# Patient Record
Sex: FEMALE | URBAN - METROPOLITAN AREA
[De-identification: names, ages, dates, MRNs, and addresses within clinical notes are randomized per-mention and may not be internally consistent; named-entity substitution may affect disease eponyms.]

---

## 2018-10-30 ENCOUNTER — HISTORICAL (OUTPATIENT)
Dept: RADIOLOGY | Facility: HOSPITAL | Age: 48
End: 2018-10-30

## 2018-10-30 LAB
ABS NEUT (OLG): 3.86
ALBUMIN SERPL-MCNC: 3.8 GM/DL (ref 3.4–5)
ALBUMIN/GLOB SERPL: 0.9 RATIO (ref 1.1–2)
ALP SERPL-CCNC: 93 UNIT/L (ref 46–116)
ALT SERPL-CCNC: 29 UNIT/L (ref 12–78)
APPEARANCE, UA: NORMAL
AST SERPL-CCNC: 14 UNIT/L (ref 10–37)
BACTERIA #/AREA URNS AUTO: NORMAL /HPF
BASOPHILS # BLD AUTO: 0.03 X10(3)/MCL
BASOPHILS NFR BLD AUTO: 0.4 %
BILIRUB SERPL-MCNC: 0.3 MG/DL (ref 0.2–1)
BILIRUB UR QL STRIP: NEGATIVE
BILIRUBIN DIRECT+TOT PNL SERPL-MCNC: 0.08 MG/DL (ref 0–0.2)
BILIRUBIN DIRECT+TOT PNL SERPL-MCNC: 0.22 MG/DL
BUN SERPL-MCNC: 13 MG/DL (ref 7–18)
CALCIUM SERPL-MCNC: 9.3 MG/DL (ref 8.5–10.1)
CHLORIDE SERPL-SCNC: 104 MMOL/L (ref 98–107)
CHOLEST SERPL-MCNC: 220 MG/DL (ref 50–200)
CHOLEST/HDLC SERPL: 3 {RATIO} (ref 0–5)
CO2 SERPL-SCNC: 27.9 MMOL/L (ref 21–32)
COLOR UR: YELLOW
CREAT SERPL-MCNC: 0.91 MG/DL (ref 0.55–1.02)
EOSINOPHIL # BLD AUTO: 0.13 X10(3)/MCL
EOSINOPHIL NFR BLD AUTO: 1.8 %
ERYTHROCYTE [DISTWIDTH] IN BLOOD BY AUTOMATED COUNT: 15 %
GLOBULIN SER-MCNC: 4.3 GM/DL (ref 2.4–3.5)
GLUCOSE (UA): NEGATIVE
GLUCOSE SERPL-MCNC: 91 MG/DL (ref 74–106)
HCT VFR BLD AUTO: 38.4 % (ref 34–46)
HDLC SERPL-MCNC: 72 MG/DL (ref 35–60)
HGB BLD-MCNC: 12.5 GM/DL (ref 11.3–15.4)
HGB UR QL STRIP: NEGATIVE
IMM GRANULOCYTES # BLD AUTO: 0.01 10*3/UL (ref 0–0.1)
IMM GRANULOCYTES NFR BLD AUTO: 0.1 % (ref 0–1)
KETONES UR QL STRIP: NEGATIVE
LDLC SERPL CALC-MCNC: 128 MG/DL (ref 50–140)
LEUKOCYTE ESTERASE UR QL STRIP: NEGATIVE
LYMPHOCYTES # BLD AUTO: 2.58 X10(3)/MCL
LYMPHOCYTES NFR BLD AUTO: 36.1 %
MCH RBC QN AUTO: 27.7 PG (ref 27–33)
MCHC RBC AUTO-ENTMCNC: 32.6 GM/DL (ref 32–35)
MCV RBC AUTO: 85.1 FL (ref 81–97)
MONOCYTES # BLD AUTO: 0.54 X10(3)/MCL
MONOCYTES NFR BLD AUTO: 7.6 %
NEUTROPHILS # BLD AUTO: 3.86 X10(3)/MCL
NEUTROPHILS NFR BLD AUTO: 54 %
NITRITE UR QL STRIP.AUTO: NEGATIVE
PH UR STRIP: 6.5 [PH] (ref 4.6–8)
PLATELET # BLD AUTO: 309 X10(3)/MCL (ref 151–368)
PMV BLD AUTO: 10 FL
POTASSIUM SERPL-SCNC: 4.9 MMOL/L (ref 3.5–5.1)
PROT SERPL-MCNC: 8.1 GM/DL (ref 6.4–8.2)
PROT UR QL STRIP: NEGATIVE
RBC # BLD AUTO: 4.51 X10(6)/MCL (ref 3.9–5)
RBC #/AREA URNS HPF: NORMAL /HPF
SODIUM SERPL-SCNC: 139 MMOL/L (ref 136–145)
SP GR UR STRIP: 1.02 (ref 1–1.03)
SQUAMOUS EPITHELIAL, UA: NORMAL
TRIGL SERPL-MCNC: 98 MG/DL (ref 30–150)
UROBILINOGEN UR STRIP-ACNC: 0.2
VLDLC SERPL CALC-MCNC: 20 MG/DL
WBC # SPEC AUTO: 7.15 X10(3)/MCL (ref 3.4–9.2)
WBC #/AREA URNS AUTO: NORMAL /[HPF]

## 2018-11-06 ENCOUNTER — HISTORICAL (OUTPATIENT)
Dept: RADIOLOGY | Facility: HOSPITAL | Age: 48
End: 2018-11-06

## 2018-11-06 LAB
HAV IGM SERPL QL IA: NEGATIVE
HBV CORE IGM SERPL QL IA: NEGATIVE
HBV SURFACE AG SERPL QL IA: NEGATIVE
HCV AB SERPL QL IA: NEGATIVE
HEPATITIS PANEL INTERP: NORMAL

## 2024-04-16 ENCOUNTER — HOSPITAL ENCOUNTER (EMERGENCY)
Facility: HOSPITAL | Age: 54
Discharge: HOME | End: 2024-04-16
Attending: FAMILY MEDICINE
Payer: COMMERCIAL

## 2024-04-16 VITALS
TEMPERATURE: 99 F | HEIGHT: 65 IN | SYSTOLIC BLOOD PRESSURE: 120 MMHG | DIASTOLIC BLOOD PRESSURE: 74 MMHG | BODY MASS INDEX: 33.32 KG/M2 | WEIGHT: 200 LBS | HEART RATE: 88 BPM | RESPIRATION RATE: 20 BRPM | OXYGEN SATURATION: 98 %

## 2024-04-16 DIAGNOSIS — J10.1 INFLUENZA B: Primary | ICD-10-CM

## 2024-04-16 LAB
FLUAV AG UPPER RESP QL IA.RAPID: NOT DETECTED
FLUBV AG UPPER RESP QL IA.RAPID: DETECTED
SARS-COV-2 RNA RESP QL NAA+PROBE: NOT DETECTED
STREP A PCR (OHS): NOT DETECTED

## 2024-04-16 PROCEDURE — 87651 STREP A DNA AMP PROBE: CPT | Performed by: FAMILY MEDICINE

## 2024-04-16 PROCEDURE — 99284 EMERGENCY DEPT VISIT MOD MDM: CPT

## 2024-04-16 PROCEDURE — 0240U COVID/FLU A&B PCR: CPT | Performed by: FAMILY MEDICINE

## 2024-04-16 RX ORDER — PROMETHAZINE HYDROCHLORIDE AND DEXTROMETHORPHAN HYDROBROMIDE 6.25; 15 MG/5ML; MG/5ML
5 SYRUP ORAL EVERY 4 HOURS PRN
Qty: 180 ML | Refills: 0 | Status: SHIPPED | OUTPATIENT
Start: 2024-04-16 | End: 2024-04-26

## 2024-04-16 RX ORDER — OSELTAMIVIR PHOSPHATE 75 MG/1
75 CAPSULE ORAL 2 TIMES DAILY
Qty: 10 CAPSULE | Refills: 0 | Status: SHIPPED | OUTPATIENT
Start: 2024-04-16 | End: 2024-04-21

## 2024-04-16 NOTE — ED PROVIDER NOTES
Encounter Date: 4/16/2024       History     Chief Complaint   Patient presents with    Cough    Chills     Cough, chills, body aches, fatigue and headache since yesterday.       Patient is a 54-year-old female comes in with chills, cough, body aches fatigue and headache since yesterday.  States that she was home from work and today she feels a bit worse    The history is provided by the patient.     Review of patient's allergies indicates:  No Known Allergies  No past medical history on file.  No past surgical history on file.  No family history on file.  Social History     Tobacco Use    Smoking status: Every Day     Current packs/day: 0.50     Types: Cigarettes    Smokeless tobacco: Never   Substance Use Topics    Alcohol use: Yes     Comment: occasional    Drug use: Yes     Types: Marijuana     Comment: daily     Review of Systems   Constitutional:  Positive for fatigue.   HENT:  Positive for congestion and sinus pressure.    Eyes:  Positive for redness.   Respiratory:  Positive for cough.    Cardiovascular: Negative.    Gastrointestinal: Negative.    Endocrine: Negative.    Musculoskeletal: Negative.    Skin: Negative.    Neurological:  Positive for headaches.   Psychiatric/Behavioral: Negative.     All other systems reviewed and are negative.      Physical Exam     Initial Vitals [04/16/24 1453]   BP Pulse Resp Temp SpO2   120/74 88 20 98.6 °F (37 °C) 95 %      MAP       --         Physical Exam    Nursing note and vitals reviewed.  Constitutional: She appears well-developed.   HENT:   Head: Normocephalic.   Nose: Right sinus exhibits maxillary sinus tenderness. Left sinus exhibits maxillary sinus tenderness.   Mouth/Throat: Mucous membranes are normal. Posterior oropharyngeal erythema present.   Eyes: Pupils are equal, round, and reactive to light.   Neck:   Normal range of motion.  Cardiovascular:  Normal rate, regular rhythm and normal heart sounds.           Pulmonary/Chest: Breath sounds normal.    Abdominal: Abdomen is soft.   Musculoskeletal:         General: Normal range of motion.      Cervical back: Normal range of motion.     Neurological: She is alert and oriented to person, place, and time. GCS score is 15. GCS eye subscore is 4. GCS verbal subscore is 5. GCS motor subscore is 6.   Skin: Skin is warm and dry. Capillary refill takes less than 2 seconds.   Psychiatric: She has a normal mood and affect.         ED Course   Procedures  Labs Reviewed   COVID/FLU A&B PCR - Abnormal; Notable for the following components:       Result Value    Influenza B PCR Detected (*)     All other components within normal limits    Narrative:     The Xpert Xpress SARS-CoV-2/FLU/RSV plus is a rapid, multiplexed real-time PCR test intended for the simultaneous qualitative detection and differentiation of SARS-CoV-2, Influenza A, Influenza B, and respiratory syncytial virus (RSV) viral RNA in either nasopharyngeal swab or nasal swab specimens.         STREP GROUP A BY PCR - Normal    Narrative:     The Xpert Xpress Strep A test is a rapid, qualitative in vitro diagnostic test for the detection of Streptococcus pyogenes (Group A ß-hemolytic Streptococcus, Strep A) in throat swab specimens from patients with signs and symptoms of pharyngitis.            Imaging Results    None          Medications - No data to display  Medical Decision Making  This patient is a 54-year-old female who comes in with nasal congestion, weakness, chills, fever, headache, body ache  Differential diagnosis:  COVID, flu, strep    Amount and/or Complexity of Data Reviewed  Labs: ordered.     Details: Patient tested positive for influenza B                                      Clinical Impression:  Final diagnoses:  [J10.1] Influenza B (Primary)          ED Disposition Condition    Discharge Stable          ED Prescriptions       Medication Sig Dispense Start Date End Date Auth. Provider    oseltamivir (TAMIFLU) 75 MG capsule Take 1 capsule (75 mg  total) by mouth 2 (two) times daily. for 5 days 10 capsule 4/16/2024 4/21/2024 Kathy Betancourt MD    promethazine-dextromethorphan (PROMETHAZINE-DM) 6.25-15 mg/5 mL Syrp Take 5 mLs by mouth every 4 (four) hours as needed. 180 mL 4/16/2024 4/26/2024 Kathy Betancourt MD          Follow-up Information       Follow up With Specialties Details Why Contact Info    PCP  Schedule an appointment as soon as possible for a visit                Kathy Betancourt MD  04/16/24 1714

## 2024-04-16 NOTE — Clinical Note
"Vida Taylor" Yara was seen and treated in our emergency department on 4/16/2024.  She may return to work on 04/19/2024.       If you have any questions or concerns, please don't hesitate to call.      Kathy Betancourt MD"

## 2025-06-06 DIAGNOSIS — M92.60 HAGLUND'S DEFORMITY: ICD-10-CM

## 2025-06-06 DIAGNOSIS — M76.62 TENDONITIS, ACHILLES, LEFT: Primary | ICD-10-CM

## 2025-06-17 ENCOUNTER — CLINICAL SUPPORT (OUTPATIENT)
Dept: REHABILITATION | Facility: HOSPITAL | Age: 55
End: 2025-06-17
Payer: MEDICAID

## 2025-06-17 DIAGNOSIS — M76.61 TENDONITIS, ACHILLES, RIGHT: Primary | ICD-10-CM

## 2025-06-17 PROCEDURE — 97162 PT EVAL MOD COMPLEX 30 MIN: CPT

## 2025-06-17 NOTE — PROGRESS NOTES
Outpatient Rehab    Physical Therapy Evaluation (only)    Patient Name: Vida Livingston  MRN: [id number]  YOB: 1970  Encounter Date: 6/17/2025    Therapy Diagnosis:   Encounter Diagnosis   Name Primary?    Tendonitis, Achilles, right Yes     Physician: Catia Matamoros DPM    Physician Orders: Eval and Treat  Medical Diagnosis: Tendonitis, Achilles, left  Surgical Diagnosis: Not applicable for this Episode   Surgical Date: Not applicable for this Episode    Visit # / Visits Authorized:  1 / 1  Insurance Authorization Period: 6/6/2025 to 6/6/2026  Date of Evaluation: 6/17/2025  Plan of Care Certification: 6/17/2025 to 9/10/2025     Time In: 1450   Time Out: 1555  Total Time (in minutes): 65   Total Billable Time (in minutes):      Intake Outcome Measure for FOTO Survey    Therapist reviewed FOTO scores for Vida Livingston on 6/17/2025.   FOTO report - see Media section or FOTO account episode details.     Intake Score: 38%    Precautions:       Subjective   History of Present Illness  Vida is a 55 y.o. female                  History of Present Condition/Illness: Patient reports she is referred due to pain at her left ankle that began about 4 months ago with no sudden injury or accidnet and has increased over the past few months. She went to the doctor and was told that she had a spur on her heel that was expanding her achilles.    Pain     Patient reports a current pain level of 0/10.  Pain at worst is reported as 10/10.   Pain-Aggravating Factors: Walking  Burning sudden pain at rest        Past Medical History/Physical Systems Review:   Vida Livingston  has no past medical history on file.    Vida Livingston  has no past surgical history on file.    Vida currently has no medications in their medication list.    Review of patient's allergies indicates:  No Known Allergies     Objective       Ankle/Foot Range of Motion   Right Ankle/Foot   Active (deg) Passive (deg) Pain   Dorsiflexion (KE) 5        Dorsiflexion (KF) 8       Plantar Flexion 55       Ankle Inversion 25       Ankle Eversion 22       Subtalar Inversion         Subtalar Eversion         Great Toe MTP Flexion         Great Toe MTP Extension         Great Toe IP Flexion             Left Ankle/Foot   Active (deg) Passive (deg) Pain   Dorsiflexion (KE) -5       Dorsiflexion (KF) 8       Plantar Flexion 55       Ankle Inversion 25       Ankle Eversion 15       Subtalar Inversion         Subtalar Eversion         Great Toe MTP Flexion         Great Toe MTP Extension         Great Toe IP Flexion             Flexibility: gastrocnemius = max restr L              Ankle/Foot Strength - Planes of Motion   Right Strength Right Pain Left Strength Left  Pain   Dorsiflexion (L4) 4+   4     Plantar Flexion (S1) 4+   1     Inversion 4+   3+     Eversion 4+   3+     Great Toe Flexion 4+         Great Toe Extension (L5) 4+         Lesser Toes Flexion 4+         Lesser Toes Extension 4+                   Four Stage Balance Test                 Single Leg Stand - Right Foot: 12 sec  Single Leg Stand - Left Foot: 2 sec       Sit to Stand Testing  The patient completed 5 sit to stand transfers in 12 sec.               Ambulation Details    2 MWT = 310'    Gait Analysis  Gait Pattern: Antalgic  Walking Speed: Decreased         Left Side Walking Observations  Decreased: Stance Time     Ankle/Foot Observations During Gait  Left: Ankle Delayed Push Off and Flat Foot Initial Contact       Eval 6/17/25     Pain    10/10 w      Posture/Appearance    Good posture and overall appearance     Palpation    Obvious swelling and pain at posterior calcaneous     Circumference: R malleolus/calcaneous 27.8, L 29.0     Dermatomes    In tact light touch. No numbness or tingling reported.     AROM Ankle    Right -   DF = 5  DF w KB = 8  PF = 55  Inv = 25  Ev = 22    Left -   DF = -5  DF w KB = 8  PF = 55  Inv = 25  Ev = 15        Time Entry(in minutes):  PT Evaluation (Moderate) Time  Entry: 65    Assessment & Plan   Assessment  Vida presents with a condition of Moderate complexity.   Presentation of Symptoms: Evolving  Will Comorbidities Impact Care: No            Prognosis: Good  Assessment Details: Patient presents with swelling at left heel w decreased HC flexibility and strength. This limits her left ankle push off during gait and is resulting in an antalgic gait pattern. Discussed HEP to include towel HC stretch w warmth before and ice following stretching. Encouraged use of heel lift at left shoe during work and issued one here today. Discussed POC and patient in agreement.    Plan  From a physical therapy perspective, the patient would benefit from: Skilled Rehab Services    Planned therapy interventions include: Therapeutic exercise, Therapeutic activities, Neuromuscular re-education, Manual therapy, and Gait training.    Planned modalities to include: Cryotherapy (cold pack), Electrical stimulation - passive/unattended, Thermotherapy (hot pack), and Ultrasound.        Visit Frequency: 2 times Per Week for 3 Months.       This plan was discussed with Patient.   Discussion participants: Agreed Upon Plan of Care  Plan details: Patient will benefit from therapy for modalities for pain and inflammation relief, to improve flexibility and ROM at left ankle and to restore strength at left ankle for restoration of functional mobility, gait pattern and activity tolerance. Patient in agreement with plan.          The patient's spiritual, cultural, and educational needs were considered, and the patient is agreeable to the plan of care and goals.           Goals:   Active       Ambulation/movement       Patient will accommodate walking on uneven surfaces with no increase in symptoms       Start:  06/17/25    Expected End:  09/17/25               Functional outcome       Patient will show a significant change in FOTO patient-reported outcome tool to demonstrate subjective improvement       Start:   06/17/25    Expected End:  09/17/25            Patient will report at least 50% overall perceived improvement since start of care       Start:  06/17/25    Expected End:  09/17/25            Patient will demonstrate independence in home program for support of progression       Start:  06/17/25    Expected End:  09/17/25               Pain       Patient will report a 2 point reduction in pain while performing working       Start:  06/17/25    Expected End:  09/17/25               Range of Motion       Patient will achieve left ankle dorsiflexion ROM 5 degrees       Start:  06/17/25    Expected End:  09/17/25               Strength       Patient will achieve left ankle plantar flexion strength of 4/5       Start:  06/17/25    Expected End:  09/17/25                Priti Birmingham, PT

## 2025-07-15 ENCOUNTER — CLINICAL SUPPORT (OUTPATIENT)
Dept: REHABILITATION | Facility: HOSPITAL | Age: 55
End: 2025-07-15
Payer: MEDICAID

## 2025-07-15 DIAGNOSIS — M76.61 TENDONITIS, ACHILLES, RIGHT: Primary | ICD-10-CM

## 2025-07-15 PROCEDURE — 97530 THERAPEUTIC ACTIVITIES: CPT

## 2025-07-15 PROCEDURE — 97110 THERAPEUTIC EXERCISES: CPT

## 2025-07-15 PROCEDURE — 97014 ELECTRIC STIMULATION THERAPY: CPT

## 2025-07-15 PROCEDURE — 97140 MANUAL THERAPY 1/> REGIONS: CPT

## 2025-07-15 NOTE — PROGRESS NOTES
Outpatient Rehab    Physical Therapy Visit    Patient Name: Vida Livingston  MRN: [id number]  YOB: 1970  Encounter Date: 7/15/2025    Therapy Diagnosis:   Encounter Diagnosis   Name Primary?    Tendonitis, Achilles, right Yes     Physician: Catia Matamoros DPM    Physician Orders: Eval and Treat  Medical Diagnosis: Achilles tendinitis  Surgical Diagnosis: Not applicable for this Episode   Surgical Date: Not applicable for this Episode  Days Since Last Surgery: Not applicable for this Episode    Visit # / Visits Authorized:  1 / 12  Insurance Authorization Period: 6/20/2025 to 8/19/2025  Date of Evaluation: 6/17/2025  Plan of Care Certification: 6/17/2025 to 9/17/2025      Time In: 1250   Time Out: 1415  Total Time (in minutes): 85   Total Billable Time (in minutes): 85     FOTO:  Intake Score: 38%  Survey Score 2: Not applicable for this Episode%  Survey Score 3: Not applicable for this Episode%    Precautions:         Subjective   Patient returns after a month out due to scheduling conflicts. She continues to have pain at posterior ankle/Achilles. She is still using boot for standing at work..         Objective            Treatment:  Therapeutic Exercise  TE 1: Ankle TB  TE 2: marbles and toe crunches, Baps  TE 3: HC stretch  Manual Therapy  MT 1: STM and stretching at ankle  Therapeutic Activity  TA 1: standing heel raises and mini squats  TA 2: NuStep    Time Entry(in minutes):  E-Stim (Unattended) Time Entry: 15  Manual Therapy Time Entry: 15  Therapeutic Activity Time Entry: 30  Therapeutic Exercise Time Entry: 25    Assessment & Plan   Assessment: Exercise focus on STM, stretching and strengthening at left ankle with fair response. Obvious swelling and inflammation persists. ROM is improving, but functional use remains limited. Discussed using shoe lift to transition to tennis shoes, but patient says the pain is too much for that at this time. Ice and stim at end of session. Will monitor and  advance as able.       The patient will continue to benefit from skilled outpatient physical therapy in order to address the deficits listed in the problem list on the initial evaluation, provide patient and family education, and maximize the patients level of independence in the home and community environments.     The patient's spiritual, cultural, and educational needs were considered, and the patient is agreeable to the plan of care and goals.           Plan: Patient will continue to benefit from therapy for progressive AROM, strengthening, and to improve standing tolerance for functional use and return to PLOF. Patient in agreement with plan.    Goals:   Active       Ambulation/movement       Patient will accommodate walking on uneven surfaces with no increase in symptoms       Start:  06/17/25    Expected End:  09/17/25               Functional outcome       Patient will show a significant change in FOTO patient-reported outcome tool to demonstrate subjective improvement       Start:  06/17/25    Expected End:  09/17/25            Patient will report at least 50% overall perceived improvement since start of care       Start:  06/17/25    Expected End:  09/17/25            Patient will demonstrate independence in home program for support of progression       Start:  06/17/25    Expected End:  09/17/25               Pain       Patient will report a 2 point reduction in pain while performing working       Start:  06/17/25    Expected End:  09/17/25               Range of Motion       Patient will achieve left ankle dorsiflexion ROM 5 degrees       Start:  06/17/25    Expected End:  09/17/25               Strength       Patient will achieve left ankle plantar flexion strength of 4/5       Start:  06/17/25    Expected End:  09/17/25                Priti Birmingham, PT

## 2025-07-17 ENCOUNTER — CLINICAL SUPPORT (OUTPATIENT)
Dept: REHABILITATION | Facility: HOSPITAL | Age: 55
End: 2025-07-17
Payer: MEDICAID

## 2025-07-17 DIAGNOSIS — M76.61 TENDONITIS, ACHILLES, RIGHT: Primary | ICD-10-CM

## 2025-07-17 PROCEDURE — 97110 THERAPEUTIC EXERCISES: CPT

## 2025-07-17 PROCEDURE — 97014 ELECTRIC STIMULATION THERAPY: CPT

## 2025-07-17 PROCEDURE — 97530 THERAPEUTIC ACTIVITIES: CPT

## 2025-07-17 PROCEDURE — 97140 MANUAL THERAPY 1/> REGIONS: CPT

## 2025-07-17 NOTE — PROGRESS NOTES
Outpatient Rehab    Physical Therapy Visit    Patient Name: Vida Livingston  MRN: [id number]  YOB: 1970  Encounter Date: 7/17/2025    Therapy Diagnosis:   Encounter Diagnosis   Name Primary?    Tendonitis, Achilles, right Yes     Physician: Catia Matamoros DPM    Physician Orders: Eval and Treat  Medical Diagnosis: Achilles tendinitis  Surgical Diagnosis: Not applicable for this Episode   Surgical Date: Not applicable for this Episode  Days Since Last Surgery: Not applicable for this Episode    Visit # / Visits Authorized:  2 / 24  Insurance Authorization Period: 6/20/2025 to 8/19/2025  Date of Evaluation: 6/17/2025  Plan of Care Certification: 6/17/2025 to 9/17/2025      Time In: 1250   Time Out: 1417  Total Time (in minutes): 87   Total Billable Time (in minutes): 87     FOTO:  Intake Score: 38%  Survey Score 2: Not applicable for this Episode%  Survey Score 3: Not applicable for this Episode%    Precautions:         Subjective   Patient reports significant relief after first session and has been able to wear a regular shoe since then..         Objective            Treatment:  Therapeutic Exercise  TE 1: Ankle TB  TE 2: marbles and toe crunches, Baps  TE 3: HC stretch  Manual Therapy  MT 1: STM and stretching at ankle  Therapeutic Activity  TA 1: standing heel raises and mini squats  TA 2: NuStep    Time Entry(in minutes):  E-Stim (Unattended) Time Entry: 15  Ultrasound Time Entry: 8  Manual Therapy Time Entry: 10  Therapeutic Activity Time Entry: 30  Therapeutic Exercise Time Entry: 24    Assessment & Plan   Assessment: Exercise focus on STM, stretching and strengthening at left ankle with fair response. Obvious swelling and inflammation persists. ROM is improving, but functional use remains limited. Discussed using shoe lift to transition to tennis shoes, but patient says the pain is too much for that at this time. Ice and stim at end of session. Will monitor and advance as able.       The patient  will continue to benefit from skilled outpatient physical therapy in order to address the deficits listed in the problem list on the initial evaluation, provide patient and family education, and maximize the patients level of independence in the home and community environments.     The patient's spiritual, cultural, and educational needs were considered, and the patient is agreeable to the plan of care and goals.           Plan: Patient will continue to benefit from therapy for progressive AROM, strengthening, and to improve standing tolerance for functional use and return to PLOF. Patient in agreement with plan.    Goals:   Active       Ambulation/movement       Patient will accommodate walking on uneven surfaces with no increase in symptoms       Start:  06/17/25    Expected End:  09/17/25               Functional outcome       Patient will show a significant change in FOTO patient-reported outcome tool to demonstrate subjective improvement       Start:  06/17/25    Expected End:  09/17/25            Patient will report at least 50% overall perceived improvement since start of care       Start:  06/17/25    Expected End:  09/17/25            Patient will demonstrate independence in home program for support of progression       Start:  06/17/25    Expected End:  09/17/25               Pain       Patient will report a 2 point reduction in pain while performing working       Start:  06/17/25    Expected End:  09/17/25               Range of Motion       Patient will achieve left ankle dorsiflexion ROM 5 degrees       Start:  06/17/25    Expected End:  09/17/25               Strength       Patient will achieve left ankle plantar flexion strength of 4/5       Start:  06/17/25    Expected End:  09/17/25                Priti Birmingham, PT

## 2025-07-22 ENCOUNTER — CLINICAL SUPPORT (OUTPATIENT)
Dept: REHABILITATION | Facility: HOSPITAL | Age: 55
End: 2025-07-22
Payer: MEDICAID

## 2025-07-22 DIAGNOSIS — M76.61 TENDONITIS, ACHILLES, RIGHT: Primary | ICD-10-CM

## 2025-07-22 PROCEDURE — 97140 MANUAL THERAPY 1/> REGIONS: CPT

## 2025-07-22 PROCEDURE — 97110 THERAPEUTIC EXERCISES: CPT

## 2025-07-22 PROCEDURE — 97014 ELECTRIC STIMULATION THERAPY: CPT

## 2025-07-22 PROCEDURE — 97530 THERAPEUTIC ACTIVITIES: CPT

## 2025-07-22 PROCEDURE — 97112 NEUROMUSCULAR REEDUCATION: CPT

## 2025-07-22 NOTE — PROGRESS NOTES
Outpatient Rehab    Physical Therapy Progress Note    Patient Name: Vida Livingston  MRN: [id number]  YOB: 1970  Encounter Date: 7/22/2025    Therapy Diagnosis:   Encounter Diagnosis   Name Primary?    Tendonitis, Achilles, right Yes     Physician: Catia Matamoros DPM    Physician Orders: Eval and Treat  Medical Diagnosis: Achilles tendinitis  Surgical Diagnosis: Not applicable for this Episode   Surgical Date: Not applicable for this Episode  Days Since Last Surgery: Not applicable for this Episode    Visit # / Visits Authorized:  3 / 24  Insurance Authorization Period: 6/20/2025 to 8/19/2025  Date of Evaluation: 6/17/2025  Plan of Care Certification: 6/17/2025 to 9/17/2025      Time In: 0857   Time Out: 1035  Total Time (in minutes): 98   Total Billable Time (in minutes): 98     FOTO:  Intake Score (%): 38  Survey Score 2 (%): Not applicable for this Episode  Survey Score 3 (%): Not applicable for this Episode    Precautions:       Subjective   Patient reports improved ease of work and able to use regular shoes now. She is reporting 79% overall improvement since start of care..  Pain reported as 4/10. with working on her foot    Objective       Ankle/Foot Swelling  Location of Measurement Right  (cm) Left  (cm)   Metatarsal Head       Figure 8       Malleolus 26.2 26.2             Ankle/Foot Range of Motion   Left Ankle/Foot   Active (deg) Passive (deg) Pain   Dorsiflexion (KE) 8       Dorsiflexion (KF) 10       Plantar Flexion 55       Ankle Inversion 28       Ankle Eversion 18       Subtalar Inversion         Subtalar Eversion         Great Toe MTP Flexion         Great Toe MTP Extension         Great Toe IP Flexion                            Ankle/Foot Strength - Planes of Motion   Right Strength Right Pain Left Strength Left  Pain   Dorsiflexion (L4)           Plantar Flexion (S1)     3-     Inversion           Eversion           Great Toe Flexion           Great Toe Extension (L5)            Lesser Toes Flexion           Lesser Toes Extension                     Treatment:  Therapeutic Exercise  TE 1: Ankle TB  TE 2: marbles and toe crunches, Baps  TE 3: HC stretch  Manual Therapy  MT 1: STM and stretching at ankle  Balance/Neuromuscular Re-Education  NMR 1: foam heel lifts and SLS step overs  Therapeutic Activity  TA 1: standing heel raises and mini squats  TA 2: NuStep    Time Entry(in minutes):  E-Stim (Unattended) Time Entry: 15  Ultrasound Time Entry: 8  Manual Therapy Time Entry: 10  Neuromuscular Re-Education Time Entry: 10  Therapeutic Activity Time Entry: 30  Therapeutic Exercise Time Entry: 26    Assessment & Plan   Assessment: Patient has completed 3 visits of therapy with focus on STM, stretching and strengthening at left ankle with good response. She is reporting 79% overall perceived improvement. She has improved AROM in all planes and improving strength at left calf. Swelling and inflammation are improving. Exercise focus on continued ROM and strengthening improvements. Advanced proprioceptive retraining today with mod difficulty, but able to complete. Encouraged continued use of shoes w lift for work purposes. Ice and stim at end of session. Great patient. Will advance as able.       The patient will continue to benefit from skilled outpatient physical therapy in order to address the deficits listed in the problem list on the initial evaluation, provide patient and family education, and maximize the patients level of independence in the home and community environments.     The patient's spiritual, cultural, and educational needs were considered, and the patient is agreeable to the plan of care and goals.           Plan: Patient will continue to benefit from therapy for progressive AROM, strengthening, and to improve standing tolerance for functional use and return to PLOF. Patient in agreement with plan.    Goals:   Active       Ambulation/movement       Patient will accommodate  walking on uneven surfaces with no increase in symptoms (Progressing)       Start:  06/17/25    Expected End:  09/17/25               Functional outcome       Patient will show a significant change in FOTO patient-reported outcome tool to demonstrate subjective improvement       Start:  06/17/25    Expected End:  09/17/25            Patient will report at least 50% overall perceived improvement since start of care (Met)       Start:  06/17/25    Expected End:  09/17/25    Resolved:  07/22/25         Patient will demonstrate independence in home program for support of progression (Progressing)       Start:  06/17/25    Expected End:  09/17/25               Strength       Patient will achieve left ankle plantar flexion strength of 4/5 (Progressing)       Start:  06/17/25    Expected End:  09/17/25              Resolved       Pain       Patient will report a 2 point reduction in pain while performing working (Met)       Start:  06/17/25    Expected End:  09/17/25    Resolved:  07/22/25            Range of Motion       Patient will achieve left ankle dorsiflexion ROM 5 degrees (Met)       Start:  06/17/25    Expected End:  09/17/25    Resolved:  07/22/25             Priti Birmingham, PT

## 2025-07-23 ENCOUNTER — CLINICAL SUPPORT (OUTPATIENT)
Dept: REHABILITATION | Facility: HOSPITAL | Age: 55
End: 2025-07-23
Payer: MEDICAID

## 2025-07-23 DIAGNOSIS — M76.61 TENDONITIS, ACHILLES, RIGHT: Primary | ICD-10-CM

## 2025-07-23 PROCEDURE — 97110 THERAPEUTIC EXERCISES: CPT

## 2025-07-23 PROCEDURE — 97014 ELECTRIC STIMULATION THERAPY: CPT

## 2025-07-23 PROCEDURE — 97035 APP MDLTY 1+ULTRASOUND EA 15: CPT

## 2025-07-23 PROCEDURE — 97140 MANUAL THERAPY 1/> REGIONS: CPT

## 2025-07-23 NOTE — PROGRESS NOTES
Outpatient Rehab    Physical Therapy Visit    Patient Name: Vida Livingston  MRN: [id number]  YOB: 1970  Encounter Date: 7/23/2025    Therapy Diagnosis:   Encounter Diagnosis   Name Primary?    Tendonitis, Achilles, right Yes     Physician: Catia Matamoros DPM    Physician Orders: Eval and Treat  Medical Diagnosis: Achilles tendinitis  Surgical Diagnosis: Not applicable for this Episode   Surgical Date: Not applicable for this Episode  Days Since Last Surgery: Not applicable for this Episode    Visit # / Visits Authorized:  4 / 24  Insurance Authorization Period: 6/20/2025 to 8/19/2025  Date of Evaluation: 6/17/2025  Plan of Care Certification: 6/17/2025 to 9/17/2025      Time In: 0308   Time Out: 0402  Total Time (in minutes): 54   Total Billable Time (in minutes): 54     FOTO:  Intake Score: 38%  Survey Score 2: Not applicable for this Episode%  Survey Score 3: Not applicable for this Episode%    Precautions:         Subjective   Patient is hurting today after caring for her grand daughter. She does feel that she is doing better, but it still hurts and swells when she is on it for prolonged periods of time..         Objective            Treatment:  Therapeutic Exercise  TE 1: Ankle TB  TE 3: HC stretch  Manual Therapy  MT 1: STM and stretching at ankle  Therapeutic Activity  TA 2: NuStep    Time Entry(in minutes):  E-Stim (Unattended) Time Entry: 15  Ultrasound Time Entry: 8  Manual Therapy Time Entry: 10  Therapeutic Exercise Time Entry: 21    Assessment & Plan   Assessment: Exercise focus on continued ROM and strengthening improvements. Increased pain and difficulty with NuStep today, so limited time there. No proprioceptive retraining or standing activity due to increased pain. STM and modalities with reports of relief. Will continue full exercise program as able. Encouraged continued use of shoes w lift for work purposes. Ice and stim at end of session. Great patient. Will advance as able.        The patient will continue to benefit from skilled outpatient physical therapy in order to address the deficits listed in the problem list on the initial evaluation, provide patient and family education, and maximize the patients level of independence in the home and community environments.     The patient's spiritual, cultural, and educational needs were considered, and the patient is agreeable to the plan of care and goals.           Plan: Patient will continue to benefit from therapy for progressive AROM, strengthening, and to improve standing tolerance for functional use and return to PLOF. Patient in agreement with plan.    Goals:   Active       Ambulation/movement       Patient will accommodate walking on uneven surfaces with no increase in symptoms (Progressing)       Start:  06/17/25    Expected End:  09/17/25               Functional outcome       Patient will show a significant change in FOTO patient-reported outcome tool to demonstrate subjective improvement       Start:  06/17/25    Expected End:  09/17/25            Patient will report at least 50% overall perceived improvement since start of care (Met)       Start:  06/17/25    Expected End:  09/17/25    Resolved:  07/22/25         Patient will demonstrate independence in home program for support of progression (Progressing)       Start:  06/17/25    Expected End:  09/17/25               Strength       Patient will achieve left ankle plantar flexion strength of 4/5 (Progressing)       Start:  06/17/25    Expected End:  09/17/25              Resolved       Pain       Patient will report a 2 point reduction in pain while performing working (Met)       Start:  06/17/25    Expected End:  09/17/25    Resolved:  07/22/25            Range of Motion       Patient will achieve left ankle dorsiflexion ROM 5 degrees (Met)       Start:  06/17/25    Expected End:  09/17/25    Resolved:  07/22/25             Priti Birmingham, PT

## 2025-07-30 ENCOUNTER — CLINICAL SUPPORT (OUTPATIENT)
Dept: REHABILITATION | Facility: HOSPITAL | Age: 55
End: 2025-07-30
Payer: MEDICAID

## 2025-07-30 DIAGNOSIS — M92.62 HAGLUND'S DEFORMITY OF LEFT HEEL: ICD-10-CM

## 2025-07-30 DIAGNOSIS — M76.61 TENDONITIS, ACHILLES, RIGHT: Primary | ICD-10-CM

## 2025-07-30 PROCEDURE — 97530 THERAPEUTIC ACTIVITIES: CPT

## 2025-07-30 PROCEDURE — 97110 THERAPEUTIC EXERCISES: CPT

## 2025-07-30 PROCEDURE — 97112 NEUROMUSCULAR REEDUCATION: CPT

## 2025-07-30 PROCEDURE — 97014 ELECTRIC STIMULATION THERAPY: CPT

## 2025-08-06 DIAGNOSIS — Z12.31 VISIT FOR SCREENING MAMMOGRAM: Primary | ICD-10-CM

## 2025-08-07 ENCOUNTER — HOSPITAL ENCOUNTER (OUTPATIENT)
Dept: RADIOLOGY | Facility: HOSPITAL | Age: 55
Discharge: HOME | End: 2025-08-07
Payer: MEDICAID

## 2025-08-07 DIAGNOSIS — Z12.31 VISIT FOR SCREENING MAMMOGRAM: ICD-10-CM

## 2025-08-14 ENCOUNTER — CLINICAL SUPPORT (OUTPATIENT)
Dept: REHABILITATION | Facility: HOSPITAL | Age: 55
End: 2025-08-14
Payer: MEDICAID

## 2025-08-14 DIAGNOSIS — M92.62 HAGLUND'S DEFORMITY OF LEFT HEEL: Primary | ICD-10-CM

## 2025-08-14 DIAGNOSIS — M76.61 TENDONITIS, ACHILLES, RIGHT: ICD-10-CM

## 2025-08-14 PROCEDURE — 97140 MANUAL THERAPY 1/> REGIONS: CPT

## 2025-08-14 PROCEDURE — 97530 THERAPEUTIC ACTIVITIES: CPT

## 2025-08-14 PROCEDURE — 97014 ELECTRIC STIMULATION THERAPY: CPT

## 2025-08-14 PROCEDURE — 97110 THERAPEUTIC EXERCISES: CPT
